# Patient Record
Sex: MALE | Race: ASIAN | NOT HISPANIC OR LATINO | Employment: OTHER | URBAN - METROPOLITAN AREA
[De-identification: names, ages, dates, MRNs, and addresses within clinical notes are randomized per-mention and may not be internally consistent; named-entity substitution may affect disease eponyms.]

---

## 2021-01-28 ENCOUNTER — NEW PATIENT COMPREHENSIVE (OUTPATIENT)
Dept: URBAN - METROPOLITAN AREA CLINIC 46 | Facility: CLINIC | Age: 56
End: 2021-01-28

## 2021-01-28 DIAGNOSIS — H52.7: ICD-10-CM

## 2021-01-28 DIAGNOSIS — H04.123: ICD-10-CM

## 2021-01-28 DIAGNOSIS — H25.812: ICD-10-CM

## 2021-01-28 DIAGNOSIS — H25.811: ICD-10-CM

## 2021-01-28 DIAGNOSIS — H40.013: ICD-10-CM

## 2021-01-28 DIAGNOSIS — Z97.3: ICD-10-CM

## 2021-01-28 PROCEDURE — 92015 DETERMINE REFRACTIVE STATE: CPT

## 2021-01-28 PROCEDURE — 92310-3 LEVEL 3 CONTACT LENS MANAGEMENT

## 2021-01-28 PROCEDURE — 92004 COMPRE OPH EXAM NEW PT 1/>: CPT

## 2021-01-28 ASSESSMENT — VISUAL ACUITY
OS_SC: 20/200
OD_CC: J8
OS_CC: J1
OD_SC: J1+
OS_SC: J1+
OS_CC: 20/50
OD_CC: 20/40
OD_SC: 20/80

## 2021-01-28 ASSESSMENT — TONOMETRY
OS_IOP_MMHG: 16
OD_IOP_MMHG: 15

## 2021-04-07 NOTE — PATIENT DISCUSSION
"""IOP stable 16/14. Will continue to monitor. Will order HVF/RNFL ON RC in 4 monts with VG.  Will ""

## 2022-03-23 NOTE — PATIENT DISCUSSION
Talk to patient about the [employer] to help vision increase and help treat EBMD. Advised this will not be covered by insurance.

## 2022-11-04 ENCOUNTER — COMPREHENSIVE EXAM (OUTPATIENT)
Dept: URBAN - METROPOLITAN AREA CLINIC 46 | Facility: CLINIC | Age: 57
End: 2022-11-04

## 2022-11-04 DIAGNOSIS — Z97.3: ICD-10-CM

## 2022-11-04 DIAGNOSIS — H52.7: ICD-10-CM

## 2022-11-04 DIAGNOSIS — H40.013: ICD-10-CM

## 2022-11-04 PROCEDURE — 92015 DETERMINE REFRACTIVE STATE: CPT

## 2022-11-04 PROCEDURE — 92310-2 LEVEL 2 CONTACT LENS MANAGEMENT

## 2022-11-04 PROCEDURE — 92014 COMPRE OPH EXAM EST PT 1/>: CPT

## 2022-11-04 PROCEDURE — 92133 CPTRZD OPH DX IMG PST SGM ON: CPT

## 2022-11-04 ASSESSMENT — VISUAL ACUITY
OS_CC: 20/40+1
OS_CC: J3
OD_SC: J3+
OS_SC: J1
OS_SC: 20/100-1
OS_PH: 20/20
OD_CC: J8
OD_SC: 20/60
OD_CC: 20/40+2
OD_BAT: 20/400
OS_BAT: 20/40

## 2022-11-04 ASSESSMENT — TONOMETRY
OD_IOP_MMHG: 12
OS_IOP_MMHG: 12

## 2023-04-11 ENCOUNTER — PRE-OP/H&P (OUTPATIENT)
Dept: URBAN - METROPOLITAN AREA SURGERY 14 | Facility: SURGERY | Age: 58
End: 2023-04-11

## 2023-04-11 ENCOUNTER — SURGERY/PROCEDURE (OUTPATIENT)
Dept: URBAN - METROPOLITAN AREA CLINIC 46 | Facility: CLINIC | Age: 58
End: 2023-04-11

## 2023-04-11 DIAGNOSIS — H25.813: ICD-10-CM

## 2023-04-11 DIAGNOSIS — H21.81: ICD-10-CM

## 2023-04-11 PROCEDURE — 66984CV REMOVE CATARACT, INSERT LENS, CUSTOM VISION

## 2023-04-11 PROCEDURE — 66999LNSR LENSAR LASER FOR CAT SX

## 2023-04-11 PROCEDURE — 99211T TECH SERVICE

## 2023-04-12 ENCOUNTER — POST-OP (OUTPATIENT)
Dept: URBAN - METROPOLITAN AREA CLINIC 46 | Facility: CLINIC | Age: 58
End: 2023-04-12

## 2023-04-12 DIAGNOSIS — Z96.1: ICD-10-CM

## 2023-04-12 PROCEDURE — 99024 POSTOP FOLLOW-UP VISIT: CPT

## 2023-04-12 ASSESSMENT — VISUAL ACUITY
OD_SC: J5
OD_SC: 20/20
OS_CC: J1+

## 2023-04-12 ASSESSMENT — TONOMETRY: OD_IOP_MMHG: 16

## 2023-04-19 ENCOUNTER — POST OP/EVAL OF SECOND EYE (OUTPATIENT)
Dept: URBAN - METROPOLITAN AREA CLINIC 46 | Facility: CLINIC | Age: 58
End: 2023-04-19

## 2023-04-19 DIAGNOSIS — H25.812: ICD-10-CM

## 2023-04-19 DIAGNOSIS — H40.013: ICD-10-CM

## 2023-04-19 DIAGNOSIS — H04.123: ICD-10-CM

## 2023-04-19 DIAGNOSIS — Z96.1: ICD-10-CM

## 2023-04-19 PROCEDURE — 99024 POSTOP FOLLOW-UP VISIT: CPT

## 2023-04-19 PROCEDURE — 99213 OFFICE O/P EST LOW 20 MIN: CPT

## 2023-04-19 ASSESSMENT — VISUAL ACUITY
OS_SC: 20/80-1
OD_SC: J5
OD_SC: 20/20
OS_SC: J1+

## 2023-04-19 ASSESSMENT — TONOMETRY
OD_IOP_MMHG: 14
OS_IOP_MMHG: 16

## 2023-05-01 ENCOUNTER — SURGERY/PROCEDURE (OUTPATIENT)
Dept: URBAN - METROPOLITAN AREA CLINIC 46 | Facility: CLINIC | Age: 58
End: 2023-05-01

## 2023-05-01 DIAGNOSIS — H25.812: ICD-10-CM

## 2023-05-01 PROCEDURE — 66999LNSR LENSAR LASER FOR CAT SX

## 2023-05-01 PROCEDURE — 66984 XCAPSL CTRC RMVL W/O ECP: CPT

## 2023-05-02 ENCOUNTER — POST-OP (OUTPATIENT)
Dept: URBAN - METROPOLITAN AREA CLINIC 46 | Facility: CLINIC | Age: 58
End: 2023-05-02

## 2023-05-02 DIAGNOSIS — Z96.1: ICD-10-CM

## 2023-05-02 PROCEDURE — 99024 POSTOP FOLLOW-UP VISIT: CPT

## 2023-05-02 ASSESSMENT — VISUAL ACUITY
OD_SC: 20/20
OD_SC: J5
OS_SC: 20/60-1
OS_PH: 20/40
OS_SC: J1+
OU_SC: J1+

## 2023-05-02 ASSESSMENT — TONOMETRY
OS_IOP_MMHG: 13
OD_IOP_MMHG: 13

## 2023-05-31 ENCOUNTER — POST-OP (OUTPATIENT)
Dept: URBAN - METROPOLITAN AREA CLINIC 46 | Facility: CLINIC | Age: 58
End: 2023-05-31

## 2023-05-31 DIAGNOSIS — Z96.1: ICD-10-CM

## 2023-05-31 PROCEDURE — 99024 POSTOP FOLLOW-UP VISIT: CPT

## 2023-05-31 ASSESSMENT — TONOMETRY
OD_IOP_MMHG: 14
OS_IOP_MMHG: 15

## 2023-05-31 ASSESSMENT — VISUAL ACUITY
OS_SC: J1+
OD_SC: J2
OD_SC: 20/20-1
OS_PH: 20/20-2
OS_SC: 20/70

## 2023-12-29 ENCOUNTER — FOLLOW UP (OUTPATIENT)
Dept: URBAN - METROPOLITAN AREA CLINIC 46 | Facility: CLINIC | Age: 58
End: 2023-12-29

## 2023-12-29 DIAGNOSIS — H40.013: ICD-10-CM

## 2023-12-29 PROCEDURE — 92133 CPTRZD OPH DX IMG PST SGM ON: CPT

## 2023-12-29 PROCEDURE — 92083 EXTENDED VISUAL FIELD XM: CPT

## 2023-12-29 PROCEDURE — 92012 INTRM OPH EXAM EST PATIENT: CPT

## 2023-12-29 ASSESSMENT — VISUAL ACUITY
OD_SC: 20/20
OS_SC: 20/70
OS_PH: 20/25-1

## 2023-12-29 ASSESSMENT — TONOMETRY
OD_IOP_MMHG: 11
OS_IOP_MMHG: 12

## 2025-02-17 ENCOUNTER — APPOINTMENT (OUTPATIENT)
Dept: URBAN - METROPOLITAN AREA CLINIC 120 | Facility: CLINIC | Age: 60
Setting detail: DERMATOLOGY
End: 2025-02-17

## 2025-02-17 DIAGNOSIS — D22 MELANOCYTIC NEVI: ICD-10-CM

## 2025-02-17 DIAGNOSIS — Z71.89 OTHER SPECIFIED COUNSELING: ICD-10-CM

## 2025-02-17 PROBLEM — D22.0 MELANOCYTIC NEVI OF LIP: Status: ACTIVE | Noted: 2025-02-17

## 2025-02-17 PROCEDURE — ? PHOTO-DOCUMENTATION

## 2025-02-17 PROCEDURE — 99202 OFFICE O/P NEW SF 15 MIN: CPT

## 2025-02-17 PROCEDURE — ? COUNSELING

## 2025-02-17 ASSESSMENT — LOCATION DETAILED DESCRIPTION DERM
LOCATION DETAILED: LEFT SUPERIOR VERMILION LIP
LOCATION DETAILED: RIGHT INFERIOR VERMILION LIP
LOCATION DETAILED: LEFT INFERIOR MUCOSAL LIP
LOCATION DETAILED: LEFT INFERIOR VERMILION LIP

## 2025-02-17 ASSESSMENT — LOCATION SIMPLE DESCRIPTION DERM
LOCATION SIMPLE: LEFT INFERIOR LIP
LOCATION SIMPLE: LEFT INFERIOR MUCOSAL LIP
LOCATION SIMPLE: LEFT SUPERIOR LIP
LOCATION SIMPLE: RIGHT INFERIOR LIP

## 2025-02-17 ASSESSMENT — LOCATION ZONE DERM: LOCATION ZONE: LIP

## 2025-02-17 NOTE — HPI: EVALUATION OF SKIN LESION(S)
Hpi Title: Evaluation of Skin Lesions
Additional History: Patient denies any changes in any of the lesions, but reports that he has several new lesions in 6+ months